# Patient Record
Sex: FEMALE | Race: BLACK OR AFRICAN AMERICAN
[De-identification: names, ages, dates, MRNs, and addresses within clinical notes are randomized per-mention and may not be internally consistent; named-entity substitution may affect disease eponyms.]

---

## 2019-04-17 ENCOUNTER — HOSPITAL ENCOUNTER (EMERGENCY)
Dept: HOSPITAL 10 - FTE | Age: 54
Discharge: HOME | End: 2019-04-17
Payer: COMMERCIAL

## 2019-04-17 ENCOUNTER — HOSPITAL ENCOUNTER (EMERGENCY)
Dept: HOSPITAL 91 - FTE | Age: 54
Discharge: HOME | End: 2019-04-17
Payer: COMMERCIAL

## 2019-04-17 VITALS — SYSTOLIC BLOOD PRESSURE: 133 MMHG | DIASTOLIC BLOOD PRESSURE: 66 MMHG | HEART RATE: 94 BPM | RESPIRATION RATE: 19 BRPM

## 2019-04-17 VITALS
BODY MASS INDEX: 28.72 KG/M2 | WEIGHT: 152.12 LBS | WEIGHT: 152.12 LBS | HEIGHT: 61 IN | BODY MASS INDEX: 28.72 KG/M2 | HEIGHT: 61 IN

## 2019-04-17 DIAGNOSIS — M79.672: Primary | ICD-10-CM

## 2019-04-17 PROCEDURE — 99284 EMERGENCY DEPT VISIT MOD MDM: CPT

## 2019-04-17 PROCEDURE — 96372 THER/PROPH/DIAG INJ SC/IM: CPT

## 2019-04-17 RX ADMIN — KETOROLAC TROMETHAMINE 1 MG: 30 INJECTION, SOLUTION INTRAMUSCULAR at 16:14

## 2019-04-17 NOTE — ERD
ER Documentation


Chief Complaint


Chief Complaint





LEFT FOOT PAIN/INJURY





HPI


53-year-old female who presents with pain in her left heel.  She states this 


stems from an accident that occurred in 2011 where she had to have surgery and 


was wearing a boot.  She is very active and every now and then the pain flares 


up in this particular flareup has been going on for about 2 weeks.  She is been 


taking Motrin but Motrin does not control her pain anymore and she states is 


very difficult for her to sleep secondary to the throbbing pain.  She is 


ambulatory.  No numbness or tingling.





ROS


All systems reviewed and are negative except as per history of present illness.





Medications


Home Meds


Active Scripts


Hydrocodone/Acetaminophen (Norco 5-325 Tablet) 1 Each Tablet, 1 TAB PO Q6H PRN 


for PAIN, #15 TAB


   Prov:JEFFREY GIL PA-C         4/17/19


Ibuprofen* (Motrin*) 800 Mg Tab, 800 MG PO Q6, #30 TAB


   Prov:JEFFREY GIL PA-C         4/17/19


Cyclobenzaprine Hcl* (Cyclobenzaprine Hcl*) 10 Mg Tablet, 10 MG PO TID, #15 TAB


   Prov:JEFFREY GIL PA-C         4/17/19





Allergies


Allergies:  


Coded Allergies:  


     No Known Allergy (Unverified , 4/17/19)





PMhx/Soc


Medical and Surgical Hx:  pt denies Medical Hx, pt denies Surgical Hx


History of Surgery:  Yes (hystrectomy)


Hx Alcohol Use:  No


Hx Substance Use:  No


Hx Tobacco Use:  No


Smoking Status:  Never smoker





FmHx


Family History:  No diabetes





Physical Exam


Vitals





Vital Signs


  Date      Temp  Pulse  Resp  B/P (MAP)   Pulse Ox  O2          O2 Flow    FiO2


Time                                                 Delivery    Rate


   4/17/19  98.4     94    19      133/66        98


     15:14                           (88)





Physical Exam


INITIAL VITAL SIGNS: Reviewed by me


GENERAL: Awake, alert and oriented x 4, well appearing, nontoxic, speaking in 


full sentences. No acute distress


HEAD: Atraumatic


 


RESPIRATORY: Clear to auscultation bilaterally. Symmetric chest wall rise.  No 


wheezing or rales. No accessory muscle use.  


CV: Regular rate and rhythm. No murmurs, rubs, or gallops.  


 Lower Extremity -left:


 Skin:          No laceration


 Compartments:   Soft


 Motor:         Full active range of motion  ankle/foot


 Sensation:      Intact to light touch FDWS/MF/LF/P surfaces.


 Bones:       Nontender pelvis/knee/proximal tibia/ malleoli/foot


 Joints:         No effusion or laxity


 Pulses/Perfusion:    2+ DP, Capillary refill < 2 seconds


Results 24 hrs





Current Medications


 Medications
   Dose
          Sig/Saulo
       Start Time
   Status  Last


 (Trade)       Ordered        Route
 PRN     Stop Time              Admin
Dose


                              Reason                                Admin


 Ketorolac
     30 mg          ONCE  STAT
    4/17/19       DC           4/17/19


Tromethamine
                 IM
            16:08
                       16:14



 (Toradol)                                   4/17/19 16:09








Procedures/MDM


Patient has nontraumatic pain in her left heel.  She does have remote history of


injury to this heel.  She is ambulatory neurovascular intact.  No indication for


x-rays but I recommended she follow-up with her primary care doctor for possible


outpatient referral to get an MRI if needed.  She was given Toradol here for 


prescription for ibuprofen Flexeril and Norco.  Patient counseled regarding my 


diagnostic impression and care plan. Prior to discharge all questions answered. 


Pt agrees with treatment plan and understands strict return precautions. Pt is 


instructed to follow up with primary care provider within 24-48 hours. 


Precautionary instructions provided including instructions to return to the ER 


if not improving or for any worsening or changing symptoms or concerns.





Departure


Diagnosis:  


   Primary Impression:  


   Heel pain


Condition:  Stable


Patient Instructions:  Myalgias





Additional Instructions:  


Call your primary care doctor TOMORROW for an appointment during the next 1-2 


days.See the doctor sooner or return here if your condition worsens before your 


appointment time.











JEFFREY GIL PA-C            Apr 17, 2019 16:22


SVETLANA SWAN MD             Apr 17, 2019 18:44

## 2019-08-03 ENCOUNTER — HOSPITAL ENCOUNTER (EMERGENCY)
Dept: HOSPITAL 10 - FTE | Age: 54
Discharge: HOME | End: 2019-08-03
Payer: COMMERCIAL

## 2019-08-03 ENCOUNTER — HOSPITAL ENCOUNTER (EMERGENCY)
Dept: HOSPITAL 91 - FTE | Age: 54
Discharge: HOME | End: 2019-08-03
Payer: COMMERCIAL

## 2019-08-03 VITALS
HEIGHT: 62 IN | HEIGHT: 62 IN | BODY MASS INDEX: 29.05 KG/M2 | WEIGHT: 157.85 LBS | BODY MASS INDEX: 29.05 KG/M2 | WEIGHT: 157.85 LBS

## 2019-08-03 VITALS — SYSTOLIC BLOOD PRESSURE: 147 MMHG | HEART RATE: 84 BPM | DIASTOLIC BLOOD PRESSURE: 81 MMHG | RESPIRATION RATE: 16 BRPM

## 2019-08-03 DIAGNOSIS — K13.79: Primary | ICD-10-CM

## 2019-08-03 PROCEDURE — 99283 EMERGENCY DEPT VISIT LOW MDM: CPT

## 2019-08-03 NOTE — ERD
ER Documentation


Chief Complaint


Chief Complaint





MOUTH SORES X 1 MONTH.





HPI


54-year-old female presents to ED complaining of mouth sores times today.  She 


states that she ate hot peanuts earlier today around 3 PM and she noticed these 


bumps on her tongue.  She states that she has taken Benadryl with no relief of 


her symptoms.  She denies any fevers or chills.  She reports past medical 


history of chronic sinusitis.  She denies any dyspnea, coughing, signs of 


respiratory distress.  She reports that she has never experienced this before.  


She had concerns for an allergic reaction to peanuts however she has eaten 


peanuts throughout her life without any reaction.





ROS


All systems reviewed and are negative except as per history of present illness.





Medications


Home Meds


Active Scripts


Prednisone* (Prednisone*) 20 Mg Tab, 40 MG PO DAILY for 4 Days, TAB


   Prov:LUIS MANUEL HENRY PA-C         8/3/19


Diphenhydramine Hcl* (Benadryl*) 25 Mg Cap, 25 MG PO Q6 PRN for ITCHING/RASH, 


#30 TAB


   Prov:LUIS MANUEL HENRY PA-C         8/3/19


Hydrocodone/Acetaminophen (Norco 5-325 Tablet) 1 Each Tablet, 1 TAB PO Q6H PRN 


for PAIN, #15 TAB


   Prov:JEFFREY GIL PA-C         4/17/19


Ibuprofen* (Motrin*) 800 Mg Tab, 800 MG PO Q6, #30 TAB


   Prov:JEFFREY GIL PA-C         4/17/19


Cyclobenzaprine Hcl* (Cyclobenzaprine Hcl*) 10 Mg Tablet, 10 MG PO TID, #15 TAB


   Prov:JEFFREY GIL PA-C         4/17/19





Allergies


Allergies:  


Coded Allergies:  


     No Known Allergy (Unverified , 4/17/19)





PMhx/Soc


History of Surgery:  Yes (HYSTERECTOMY)


Anesthesia Reaction:  No


Hx Neurological Disorder:  No


Hx Respiratory Disorders:  No


Hx Cardiac Disorders:  No


Hx Psychiatric Problems:  No


Hx Miscellaneous Medical Probl:  No


Hx Alcohol Use:  No


Hx Substance Use:  No


Hx Tobacco Use:  No


Smoking Status:  Never smoker





FmHx


Family History:  No diabetes





Physical Exam


Vitals





Vital Signs


  Date      Temp  Pulse  Resp  B/P (MAP)   Pulse Ox  O2          O2 Flow    FiO2


Time                                                 Delivery    Rate


    8/3/19  99.1     84    16      147/81        98


     19:30                          (103)





Physical Exam


Const:   No acute distress


Head:   Atraumatic 


Eyes:    Normal Conjunctiva


ENT:    Enlarged taste buds on the tongue unable to be scraped off with tongue 


blade


Resp:   Clear to auscultation bilaterally


Cardio:   Regular rate and rhythm, 


Abd:    Soft, non tender, non distended. 


Neur:   Awake and alert


Psych:    Normal Mood and Affect





Procedures/MDM


ED COURSE:


The patient was stable throughout ED course. I kept the patient informed of 


laboratory and diagnostic imaging results throughout the ED course.  








MEDICAL DECISION MAKING:


Patient is a 4-year-old female presenting with enlarged taste buds times this 


morning.  I have low suspicion for oral cancer, angioedema, abscess.  At this 


time I suspect that the patient is suffering from enlarged taste buds.  Patient 


was reassured that this will resolve on its own.  I recommended warm salt water 


gargle several times a day.  I prescribed on outpatient basis Benadryl and 4 


days of prednisone to help patient with her symptoms.  Patient was told to 


return back to ED if symptoms persist or worsen.  Patient was told to follow-up 


with primary care in the next 1 to 2 days.  All questions were answered.





Vital signs were reviewed. Patient is afebrile. Patient was not hypoxic. Patient


was hemodynamically stable. Patient was told to follow up with primary care for 


further care and management. 








PRESCRIPTION: Prednisone, Benadryl





DISCHARGE:


At this time, patient is stable for discharge and outpatient management. I have 


instructed the patient to follow-up with their primary care physician in 1-2 d


ays. I have discussed with the patient the possibility of needing to see a 


specialist for further workup and imaging studies if symptoms persist. I have 


instructed the patient to promptly return to the ER for any new or worsening 


symptoms including increased pain, fever, nausea, vomiting, weakness or LOC. The


patient expressed understanding of and agreement with this plan. All questions 


were answered. Home care instructions were provided. 





Disclaimer: Inadvertent spelling and grammatical errors are likely due to 


EHR/dictation software use and do not reflect on the overall quality of patient 


care. Also, please note that the electronic time recorded on this note does not 


necessarily reflect the actual time of the patient encounter.





Departure


Diagnosis:  


   Primary Impression:  


   Mouth sores


Condition:  Good


Patient Instructions:  What Are Oral Lesions? (Precancerous and Cancerous), Your


Mouth: Keeping It Healthy


Referrals:  


[facility]


YOU HAVE RECEIVED A MEDICAL SCREENING EXAM AND THE RESULTS INDICATE THAT YOU DO 


NOT HAVE A CONDITION THAT REQUIRES URGENT TREATMENT IN THE EMERGENCY DEPARTMENT.





FURTHER EVALUATION AND TREATMENT OF YOUR CONDITION CAN WAIT UNTIL YOU ARE SEEN 


IN YOUR DOCTORS OFFICE WITHIN THE NEXT 1-2 DAYS. IT IS YOUR RESPONSIBILITY TO 


MAKE AN APPOINTMENT FOR FOLOW-UP CARE.





IF YOU HAVE A PRIMARY DOCTOR


--you should call your primary doctor and schedule an appointment





IF YOU DO NOT HAVE A PRIMARY DOCTOR YOU CAN CALL OUR PHYSICIAN REFERRAL HOTLINE 


AT


 [phone] 





IF YOU CAN NOT AFFORD TO SEE A PHYSICIAN YOU CAN CHOSE FROM THE FOLLOWING 


[facility] [phone] [address] [facility]. [facility] [phone] [address] [facility]. [facility] [phone] [address] [facility] [phone] [address] [facility]. [facility] [phone] [address] [facility]. [facility] [phone]


[address] [facility]. [facility]


YOU HAVE RECEIVED A MEDICAL SCREENING EXAM AND THE RESULTS INDICATE THAT YOU DO 


NOT HAVE A CONDITION THAT REQUIRES URGENT TREATMENT IN THE EMERGENCY DEPARTMENT.





FURTHER EVALUATION AND TREATMENT OF YOUR CONDITION CAN WAIT UNTIL YOU ARE SEEN 


IN YOUR DOCTORS OFFICE WITHIN THE NEXT 1-2 DAYS. IT IS YOUR RESPONSIBILITY TO 


MAKE AN APPOINTMENT FOR FOLOW-UP CARE.





IF YOU HAVE A PRIMARY DOCTOR


--you should call your primary doctor and schedule and appointment





IF YOU DO NOT HAVE A PRIMARY DOCTOR YOU CAN CALL OUR PHYSICIAN REFERRAL HOTLINE 


AT [phone].





IF YOU CAN NOT AFFORD TO SEE A PHYSICIAN YOU CAN CHOSE FROM THE FOLLOWING [facility]


INSTITUTIONS:





[facility]


[address] [place] [address]





[facility]


[address] [place] [address]





[facility]


[address] [place] [address]





Additional Instructions:  


Call your primary care doctor TOMORROW for an appointment during the next 1-2 


days.See the doctor sooner or return here if your condition worsens before your 


appointment time.











LUIS MANUEL HENRY PA-C            Aug 3, 2019 20:03